# Patient Record
Sex: FEMALE | URBAN - NONMETROPOLITAN AREA
[De-identification: names, ages, dates, MRNs, and addresses within clinical notes are randomized per-mention and may not be internally consistent; named-entity substitution may affect disease eponyms.]

---

## 2021-10-26 ENCOUNTER — APPOINTMENT (OUTPATIENT)
Dept: URBAN - NONMETROPOLITAN AREA CLINIC 39 | Age: 66
Setting detail: DERMATOLOGY
End: 2021-10-26

## 2021-10-26 DIAGNOSIS — L65.8 OTHER SPECIFIED NONSCARRING HAIR LOSS: ICD-10-CM

## 2021-10-26 PROCEDURE — OTHER MIPS QUALITY: OTHER

## 2021-10-26 PROCEDURE — 99203 OFFICE O/P NEW LOW 30 MIN: CPT

## 2021-10-26 PROCEDURE — OTHER ADDITIONAL NOTES: OTHER

## 2021-10-26 PROCEDURE — OTHER COUNSELING: OTHER

## 2021-10-26 ASSESSMENT — WOMENS ALOPECIA SEVERITY SCALE: PLEASE ASSSESS THE PATIENT'S MID SCALP ALOPECIA SEVERITY BY COMPARING IT TO THESE PHOTOS: WIDENED MIDLINE PART

## 2021-10-26 ASSESSMENT — LOCATION ZONE DERM: LOCATION ZONE: SCALP

## 2021-10-26 ASSESSMENT — LOCATION DETAILED DESCRIPTION DERM: LOCATION DETAILED: RIGHT SUPERIOR PARIETAL SCALP

## 2021-10-26 ASSESSMENT — LOCATION SIMPLE DESCRIPTION DERM: LOCATION SIMPLE: SCALP

## 2021-10-26 NOTE — HPI: HAIR LOSS
How Did The Hair Loss Occur?: gradual in onset
How Severe Is Your Hair Loss?: mild
What Hair Products Do You Use?: Herbal essences and apple cider vinegar
Additional History: Stated that she has spoken to her PCP regarding her thinning hair since she has hypothyroidism and he told her that her TSH was WNL. Denies history of anemia. Had surgery on her arm about 2 weeks ago.

## 2021-10-26 NOTE — PROCEDURE: ADDITIONAL NOTES
Additional Notes: Will request labs from Dr. Reyes. Once labs are received and reviewed will contact patient with additional recommendations. Recommend starting OTC Minoxidil at this time.
Render Risk Assessment In Note?: no
Detail Level: Detailed

## 2021-11-23 ENCOUNTER — APPOINTMENT (OUTPATIENT)
Dept: URBAN - NONMETROPOLITAN AREA CLINIC 39 | Age: 66
Setting detail: DERMATOLOGY
End: 2021-11-23

## 2021-11-23 DIAGNOSIS — Z79.899 OTHER LONG TERM (CURRENT) DRUG THERAPY: ICD-10-CM

## 2021-11-23 PROCEDURE — OTHER ORDER TESTS: OTHER
